# Patient Record
Sex: MALE | Race: WHITE | URBAN - METROPOLITAN AREA
[De-identification: names, ages, dates, MRNs, and addresses within clinical notes are randomized per-mention and may not be internally consistent; named-entity substitution may affect disease eponyms.]

---

## 2021-04-13 DIAGNOSIS — Z23 ENCOUNTER FOR IMMUNIZATION: ICD-10-CM

## 2023-11-24 ENCOUNTER — OFFICE VISIT (OUTPATIENT)
Age: 73
End: 2023-11-24
Payer: MEDICARE

## 2023-11-24 VITALS
HEART RATE: 99 BPM | BODY MASS INDEX: 32.89 KG/M2 | RESPIRATION RATE: 18 BRPM | OXYGEN SATURATION: 97 % | HEIGHT: 68 IN | TEMPERATURE: 97.7 F | DIASTOLIC BLOOD PRESSURE: 84 MMHG | SYSTOLIC BLOOD PRESSURE: 137 MMHG | WEIGHT: 217 LBS

## 2023-11-24 DIAGNOSIS — R05.1 ACUTE COUGH: ICD-10-CM

## 2023-11-24 DIAGNOSIS — J01.00 ACUTE NON-RECURRENT MAXILLARY SINUSITIS: Primary | ICD-10-CM

## 2023-11-24 PROBLEM — N62 GYNECOMASTIA, MALE: Status: ACTIVE | Noted: 2017-06-13

## 2023-11-24 PROBLEM — E29.9 TESTICULAR DYSFUNCTION: Status: ACTIVE | Noted: 2017-06-13

## 2023-11-24 PROBLEM — K38.9 APPENDIX DISEASE: Status: ACTIVE | Noted: 2018-05-31

## 2023-11-24 PROBLEM — F40.240 CLAUSTROPHOBIA: Status: ACTIVE | Noted: 2021-11-18

## 2023-11-24 PROBLEM — E03.9 HYPOTHYROIDISM (ACQUIRED): Status: ACTIVE | Noted: 2018-06-21

## 2023-11-24 PROBLEM — E78.5 HYPERLIPIDEMIA: Status: ACTIVE | Noted: 2021-11-18

## 2023-11-24 PROBLEM — G89.12: Status: ACTIVE | Noted: 2021-11-18

## 2023-11-24 PROBLEM — K86.2 PANCREATIC CYST: Status: ACTIVE | Noted: 2020-02-25

## 2023-11-24 PROBLEM — I10 HYPERTENSION: Status: ACTIVE | Noted: 2021-11-18

## 2023-11-24 PROBLEM — E78.00 PURE HYPERCHOLESTEROLEMIA: Status: ACTIVE | Noted: 2021-10-12

## 2023-11-24 PROBLEM — E04.1 THYROID NODULE: Status: ACTIVE | Noted: 2021-01-21

## 2023-11-24 LAB
SARS-COV-2 AG UPPER RESP QL IA: NEGATIVE
VALID CONTROL: NORMAL

## 2023-11-24 PROCEDURE — 87811 SARS-COV-2 COVID19 W/OPTIC: CPT

## 2023-11-24 PROCEDURE — G0463 HOSPITAL OUTPT CLINIC VISIT: HCPCS

## 2023-11-24 PROCEDURE — 99213 OFFICE O/P EST LOW 20 MIN: CPT

## 2023-11-24 RX ORDER — AZITHROMYCIN 250 MG/1
TABLET, FILM COATED ORAL
Qty: 6 TABLET | Refills: 0 | Status: SHIPPED | OUTPATIENT
Start: 2023-11-24 | End: 2023-11-28

## 2023-11-24 RX ORDER — ECHINACEA PURPUREA EXTRACT 125 MG
1 TABLET ORAL DAILY PRN
COMMUNITY

## 2023-11-24 RX ORDER — BUPROPION HYDROCHLORIDE 100 MG/1
TABLET, EXTENDED RELEASE ORAL
COMMUNITY
Start: 2023-09-26

## 2023-11-24 RX ORDER — LEVOTHYROXINE SODIUM 0.07 MG/1
1 TABLET ORAL DAILY
COMMUNITY
Start: 2023-09-15

## 2023-11-24 RX ORDER — ATORVASTATIN CALCIUM 10 MG/1
TABLET, FILM COATED ORAL
COMMUNITY
Start: 2023-11-21

## 2023-11-24 RX ORDER — ASCORBATE CALCIUM 500 MG
1 TABLET ORAL DAILY
COMMUNITY

## 2023-11-24 RX ORDER — BENZONATATE 100 MG/1
100 CAPSULE ORAL 3 TIMES DAILY PRN
Qty: 20 CAPSULE | Refills: 0 | Status: SHIPPED | OUTPATIENT
Start: 2023-11-24

## 2023-11-24 RX ORDER — LOSARTAN POTASSIUM 25 MG/1
TABLET ORAL EVERY 24 HOURS
COMMUNITY

## 2023-11-24 RX ORDER — SILDENAFIL 100 MG/1
TABLET, FILM COATED ORAL EVERY 24 HOURS
COMMUNITY

## 2023-11-24 RX ORDER — ESCITALOPRAM OXALATE 10 MG/1
TABLET ORAL EVERY 24 HOURS
COMMUNITY

## 2023-11-24 RX ORDER — OMEPRAZOLE 40 MG/1
40 CAPSULE, DELAYED RELEASE ORAL
COMMUNITY
Start: 2022-12-01 | End: 2023-12-01

## 2023-11-24 RX ORDER — ASPIRIN 81 MG/1
81 TABLET ORAL DAILY
COMMUNITY

## 2023-11-24 RX ORDER — ALBUTEROL SULFATE 90 UG/1
AEROSOL, METERED RESPIRATORY (INHALATION)
COMMUNITY

## 2023-11-24 RX ORDER — FLUTICASONE PROPIONATE 50 MCG
1 SPRAY, SUSPENSION (ML) NASAL DAILY
Qty: 9.9 ML | Refills: 0 | Status: SHIPPED | OUTPATIENT
Start: 2023-11-24

## 2023-11-24 RX ORDER — LORAZEPAM 0.5 MG/1
0.5 TABLET ORAL DAILY
COMMUNITY

## 2023-11-24 NOTE — PATIENT INSTRUCTIONS
Take antibiotic as directed for 5 days. Complete entire course of therapy even if symptoms improve and take medication with food to avoid upset stomach. Use flonase with nasal saline for congestion and take tessalon perles as directed for cough. Follow-up with PCP in 3-5 days if no improvement of symptoms. Report to the ER if symptoms worsen.

## 2025-06-29 NOTE — PROGRESS NOTES
EMERGENCY DEPARTMENT ENCOUNTER     [facility] EMERGENCY DEPARTMENT     Pt Name: Rasta Holt   MRN: [id number]   Birthdate 1979   Date of evaluation: 6/28/2025   Provider: Jorge Mclaughlin MD   PCP: Joseph Barjaas DO   Note Started: 8:48 PM EDT 6/28/25     CHIEF COMPLAINT     Chief Complaint   Patient presents with    Abdominal Pain     Left sided abdominal pain   Started yesterday and progressively gotten worse   Pt states that he thinks its diverticulitis which he has had before  Vomiting           HISTORY OF PRESENT ILLNESS:  History from : Patient   Limitations to history : None     Rasta Holt is a 45 y.o. male who presents for left flank pain.  Patient reports left-sided flank and abdominal pain that started yesterday and radiates around his abdomen.  He had some vomiting.  States it feels to him like diverticulitis.  He has had a kidney stone before but states this is definitely not that.  Denies any fevers.  No chest pain or shortness of breath.    Nursing Notes were all reviewed and agreed with or any disagreements were addressed in the HPI.     ROS: Positives and Pertinent negatives as per HPI.    PAST MEDICAL HISTORY     Past medical history:  has a past medical history of ADD (attention deficit disorder with hyperactivity), CKD (chronic kidney disease) stage 5, GFR less than 15 ml/min ([facility]), Hyperlipidemia, Hypertension, and PKD (polycystic kidney disease).    Past surgical history:  has a past surgical history that includes Upper gastrointestinal endoscopy (3/15/2011); Vasectomy; [place] tooth extraction; other surgical history (Bilateral, 5/15/13); Umbilical hernia repair (N/A, 4/17/2019); and hernia repair.    Med list:   No current facility-administered medications on file prior to encounter.     Current Outpatient Medications on File Prior to Encounter   Medication Sig Dispense Refill    mycophenolate (CELLCEPT) 250 MG capsule Take 1 capsule by mouth 2 times daily       [facility]        NAME: Charls Castleman is a 72 y.o. male  : 1950    MRN: [id number]  DATE: 2023  TIME: 12:32 PM    Assessment and Plan   Acute non-recurrent maxillary sinusitis [J01.00]  1. Acute non-recurrent maxillary sinusitis  azithromycin (ZITHROMAX) 250 mg tablet    fluticasone (FLONASE) 50 mcg/act nasal spray      2. Acute cough  benzonatate (TESSALON PERLES) 100 mg capsule    Poct Covid 19 Rapid Antigen Test        Rapid COVID negative. Will start on antibiotics as patient is having no relief with OTC products. Flonase and tessalon perles as directed for additional symptom relief. VSS in clinic, appears in no acute distress. Advised close follow-up with PCP or to report to the ER if symptoms worsen. Patient verbalizes understanding and agreeable to plan. Patient Instructions     Take antibiotic as directed for 5 days. Complete entire course of therapy even if symptoms improve and take medication with food to avoid upset stomach. Use flonase with nasal saline for congestion and take tessalon perles as directed for cough. Follow-up with PCP in 3-5 days if no improvement of symptoms. Report to the ER if symptoms worsen. Chief Complaint     Chief Complaint   Patient presents with    Cold Like Symptoms     Started 5 days ago, patient complains of cough with phlegm, sore throat, clogged ears, headache, body aches. History of Present Illness       72year old male presents for evaluation of cough and congestion for the past 5 days. He denies any known sick contacts but he relates his wife has since developed similar symptoms. He received his flu shot this year but not COVID. 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 He denies recent lung treatments. He reports occasional green/yellow sputum but denies associated fevers or shortness of breath. He also has a sore throat, ear pressure, and sinus pain/pressure.  He has tried Robitussin and used nasal saline for symptoms with minimal relief. Sinusitis  This is a new problem. The current episode started in the past 7 days. The problem is unchanged. There has been no fever. His pain is at a severity of 5/10. The pain is moderate. Associated symptoms include congestion, coughing, ear pain, headaches, sinus pressure, a sore throat and swollen glands. Pertinent negatives include no chills, hoarse voice, neck pain, shortness of breath or sneezing. Past treatments include oral decongestants and saline nose sprays. The treatment provided no relief. Review of Systems   Review of Systems   Constitutional:  Negative for activity change, appetite change, chills, fatigue and fever. HENT:  Positive for congestion, ear pain, postnasal drip, rhinorrhea, sinus pressure, sinus pain and sore throat. Negative for hoarse voice, sneezing and trouble swallowing. Respiratory:  Positive for cough. Negative for apnea, choking, chest tightness, shortness of breath, wheezing and stridor. Cardiovascular:  Negative for chest pain and palpitations. Gastrointestinal:  Negative for abdominal pain, constipation, diarrhea, nausea and vomiting. Musculoskeletal:  Negative for arthralgias, myalgias and neck pain. Skin:  Negative for color change and pallor. Allergic/Immunologic: Negative for environmental allergies and food allergies. Neurological:  Positive for headaches. Negative for dizziness and light-headedness.          Current Medications       Current Outpatient Medications:     albuterol (PROVENTIL HFA,VENTOLIN HFA) 90 mcg/act inhaler, 6 (six) times a day, Disp: , Rfl:     atorvastatin (LIPITOR) 10 mg tablet, , Disp: , Rfl:     azithromycin (ZITHROMAX) 250 mg tablet, Take 2 tablets today then 1 tablet daily x 4 days, Disp: 6 tablet, Rfl: 0    benzonatate (TESSALON PERLES) 100 mg capsule, Take 1 capsule (100 mg total) by mouth 3 (three) times a day as needed for cough, Disp: 20 capsule, Rfl: 0    buPROPion [facility]) 100 mg 12 hr tablet, , Disp: , Rfl:     escitalopram (LEXAPRO) 10 mg tablet, every 24 hours, Disp: , Rfl:     fluticasone (FLONASE) 50 mcg/act nasal spray, 1 spray into each nostril daily, Disp: 9.9 mL, Rfl: 0    levothyroxine 75 mcg tablet, Take 1 tablet by mouth daily, Disp: , Rfl:     LORazepam (ATIVAN) 0.5 mg tablet, Take 0.5 mg by mouth daily, Disp: , Rfl:     losartan (COZAAR) 25 mg tablet, every 24 hours, Disp: , Rfl:     omeprazole (PriLOSEC) 40 MG capsule, Take 40 mg by mouth, Disp: , Rfl:     sodium chloride (OCEAN) 0.65 % nasal spray, 1 spray Once daily as needed, Disp: , Rfl:     aspirin (ECOTRIN LOW STRENGTH) 81 mg EC tablet, Take 81 mg by mouth daily, Disp: , Rfl:     Calcium Ascorbate (VITAMIN C) 500 mg tablet, Take 1 tablet by mouth daily, Disp: , Rfl:     Cholecalciferol 50 MCG (2000 UT) CAPS, Take 2,000 Units by mouth, Disp: , Rfl:     sildenafil (VIAGRA) 100 mg tablet, every 24 hours, Disp: , Rfl:     Current Allergies     Allergies as of 11/24/2023 - Reviewed 11/24/2023   Allergen Reaction Noted    Iodinated contrast media Anaphylaxis 12/13/2022    Iodine - food allergy GI Intolerance and Rash 05/11/2017    Prednisone & diphenhydramine Other (See Comments) 11/24/2023    Propofol Vomiting 12/03/2021    Shellfish allergy - food allergy Hives 05/18/2021    Other Rash 02/25/2020    Wound dressings Rash 02/25/2020            The following portions of the patient's history were reviewed and updated as appropriate: allergies, current medications, past family history, past medical history, past social history, past surgical history and problem list.     History reviewed. No pertinent past medical history. History reviewed. No pertinent surgical history. History reviewed. No pertinent family history. Medications have been verified.         Objective   /84   Pulse 99   Temp 97.7 °F (36.5 °C)   Resp 18   Ht 5' 8" (1.727 m)   Wt 98.4 kg (217 lb)   SpO2 97%   BMI 32.99 kg/m²        Physical Exam     Physical Exam  Vitals and nursing note reviewed. Constitutional:       General: He is awake. He is not in acute distress. Appearance: Normal appearance. He is overweight. HENT:      Head: Normocephalic and atraumatic. Right Ear: Hearing, ear canal and external ear normal. A middle ear effusion is present. Left Ear: Hearing, ear canal and external ear normal. A middle ear effusion is present. Nose: Congestion and rhinorrhea present. Rhinorrhea is clear. Right Turbinates: Enlarged. Not swollen or pale. Left Turbinates: Enlarged. Not swollen or pale. Right Sinus: Maxillary sinus tenderness present. No frontal sinus tenderness. Left Sinus: Maxillary sinus tenderness present. No frontal sinus tenderness. Mouth/Throat:      Lips: Pink. Mouth: Mucous membranes are moist.      Pharynx: Oropharynx is clear. Uvula midline. Posterior oropharyngeal erythema and uvula swelling present. No pharyngeal swelling or oropharyngeal exudate. Tonsils: No tonsillar exudate or tonsillar abscesses. 2+ on the right. 2+ on the left. Eyes:      Conjunctiva/sclera: Conjunctivae normal.   Cardiovascular:      Rate and Rhythm: Normal rate and regular rhythm. Pulses: Normal pulses. Heart sounds: Normal heart sounds. Pulmonary:      Effort: Pulmonary effort is normal.      Breath sounds: Normal breath sounds. Musculoskeletal:      Cervical back: Full passive range of motion without pain, normal range of motion and neck supple. Lymphadenopathy:      Cervical: Cervical adenopathy present. Skin:     General: Skin is warm and dry. Neurological:      General: No focal deficit present. Mental Status: He is alert and oriented to person, place, and time. Psychiatric:         Mood and Affect: Mood normal.         Behavior: Behavior normal. Behavior is cooperative. Thought Content:  Thought content normal.         Judgment: Judgment normal.